# Patient Record
(demographics unavailable — no encounter records)

---

## 2024-11-26 NOTE — PHYSICAL EXAM
[Normal Appearance] : normal appearance [] : no respiratory distress [Normal Station and Gait] : the gait and station were normal for the patient's age [Oriented To Time, Place, And Person] : oriented to person, place, and time [Chaperone Present] : A chaperone was present in the examining room during all aspects of the physical examination [FreeTextEntry2] : Atilio Edmond NP

## 2024-11-26 NOTE — HISTORY OF PRESENT ILLNESS
[FreeTextEntry1] : 41 year old male presents to the office for a c/o penile curvature and ED. He reports that he noticed curvature about 3 years ago. He has not been able achieve substantial erections thus unable to state if he has noticed a change in curvature. He was being seen by [facility] Jaimes and underwent a duplex with ICI. Patient states that he was diagnosed with a 90 degree dorsal curvature. He has not tried any medication for ED since curvature but did try sildenafil prior with no benefit. He has not tried modeling, pentoxifylline or injections such as verapamil or Xiaflex. He is here to discuss surgical options.

## 2024-12-16 NOTE — HISTORY OF PRESENT ILLNESS
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

## 2025-01-02 NOTE — PHYSICAL EXAM
[General Appearance - Well Nourished] : well nourished [General Appearance - Well Developed] : well developed [Heart Rate And Rhythm] : heart rate and rhythm were normal [] : no respiratory distress [Respiration, Rhythm And Depth] : normal respiratory rhythm and effort [Bowel Sounds] : normal bowel sounds [Abdomen Soft] : soft [Chaperone Present] : A chaperone was present in the examining room during all aspects of the physical examination [FreeTextEntry2] : Jaskaran Hogue NP

## 2025-01-02 NOTE — PHYSICAL EXAM
[General Appearance - Well Developed] : well developed [General Appearance - Well Nourished] : well nourished [Heart Rate And Rhythm] : heart rate and rhythm were normal [] : no respiratory distress [Respiration, Rhythm And Depth] : normal respiratory rhythm and effort [Bowel Sounds] : normal bowel sounds [Abdomen Soft] : soft [Chaperone Present] : A chaperone was present in the examining room during all aspects of the physical examination [FreeTextEntry2] : Jaskaran Hogue NP

## 2025-01-02 NOTE — HISTORY OF PRESENT ILLNESS
[FreeTextEntry1] : PHQ-2  DRAFT  41M w/ ED for initial consultation w/ Dr. Beyer (Current [facility] patient. Last seen at  urology Dec 2024. New to Dr. Beyer)

## 2025-01-02 NOTE — HISTORY OF PRESENT ILLNESS
[FreeTextEntry1] : PHQ-2  DRAFT  41M w/ ED for initial consultation w/ Dr. Beyer (Current [facility] patient. Last seen at  urology Dec 2024. New to Dr. Beyer)

## 2025-02-27 NOTE — HISTORY OF PRESENT ILLNESS
[FreeTextEntry1] : Patient returns to discuss his erections.  He reports he has tried sildenafil however he reports that he does not get an erection and gets headaches with the 50 mg dose.  He has previously tried injections as low as 0.05 cc of Trimix which have resulted in priapism each time.

## 2025-04-16 NOTE — HISTORY OF PRESENT ILLNESS
[FreeTextEntry1] : Patient returns he has reported that he has not had the opportunity to use either the sildenafil or the tadalafil as his partner has been on the spiritual fast which has interfered with his ability to have sexual activity.  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  The patient now estimates that last time he saw his curvature getting worse was approximately a year ago

## 2025-04-16 NOTE — PHYSICAL EXAM
[Chaperone Present] : A chaperone was present in the examining room during all aspects of the physical examination [FreeTextEntry2] : danette pool [FreeTextEntry3] : Dorsal midshaft palpable plaque target for potential injections.

## 2025-05-15 NOTE — HISTORY OF PRESENT ILLNESS
[FreeTextEntry1] : Patient presents to the office for follow up erectile dysfunction. He reports that he was able to try PDE5 with no benefit. He is interested at possibly trialing Trimix again at a lower dose.

## 2025-05-29 NOTE — HISTORY OF PRESENT ILLNESS
[FreeTextEntry1] : Patient returns to the office for ICI with Trimix. He reports that when he used the medication at home independently prior at "the first aj" he had a sufficient erection that was bendable at the 4 hour aj.

## 2025-05-29 NOTE — PHYSICAL EXAM
[Chaperone Present] : A chaperone was present in the examining room during all aspects of the physical examination [FreeTextEntry2] : Atilio Edmond NP

## 2025-06-04 NOTE — HISTORY OF PRESENT ILLNESS
[FreeTextEntry1] : Patient reports that his ICI of Trimix at 0.02cc was beneficial for intercourse and did not last greater than 4 hours.